# Patient Record
Sex: FEMALE | Race: BLACK OR AFRICAN AMERICAN | NOT HISPANIC OR LATINO | Employment: STUDENT | URBAN - METROPOLITAN AREA
[De-identification: names, ages, dates, MRNs, and addresses within clinical notes are randomized per-mention and may not be internally consistent; named-entity substitution may affect disease eponyms.]

---
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
## 2021-04-15 ENCOUNTER — PATIENT MESSAGE (OUTPATIENT)
Dept: RESEARCH | Facility: HOSPITAL | Age: 21
End: 2021-04-15

## 2022-07-06 PROBLEM — Z11.3 SCREENING FOR STDS (SEXUALLY TRANSMITTED DISEASES): Status: ACTIVE | Noted: 2022-07-06

## 2022-07-06 PROBLEM — Z12.4 PAP SMEAR FOR CERVICAL CANCER SCREENING: Status: ACTIVE | Noted: 2022-07-06

## 2022-07-06 PROBLEM — S09.90XA HEAD INJURY: Status: RESOLVED | Noted: 2018-04-27 | Resolved: 2022-07-06

## 2022-07-06 PROBLEM — Z13.220 NEED FOR LIPID SCREENING: Status: ACTIVE | Noted: 2022-07-06

## 2022-07-06 PROBLEM — R42 DIZZINESS: Status: RESOLVED | Noted: 2018-04-27 | Resolved: 2022-07-06

## 2022-07-06 PROBLEM — K08.89 TOOTHACHE: Status: RESOLVED | Noted: 2020-08-04 | Resolved: 2022-07-06

## 2022-10-10 PROBLEM — Z13.220 NEED FOR LIPID SCREENING: Status: RESOLVED | Noted: 2022-07-06 | Resolved: 2022-10-10

## 2024-11-12 ENCOUNTER — OFFICE VISIT (OUTPATIENT)
Facility: CLINIC | Age: 24
End: 2024-11-12
Payer: MEDICAID

## 2024-11-12 ENCOUNTER — LAB VISIT (OUTPATIENT)
Dept: LAB | Facility: HOSPITAL | Age: 24
End: 2024-11-12
Payer: MEDICAID

## 2024-11-12 VITALS
OXYGEN SATURATION: 100 % | BODY MASS INDEX: 25.01 KG/M2 | HEIGHT: 64 IN | WEIGHT: 146.5 LBS | RESPIRATION RATE: 18 BRPM | HEART RATE: 73 BPM | DIASTOLIC BLOOD PRESSURE: 82 MMHG | SYSTOLIC BLOOD PRESSURE: 118 MMHG | TEMPERATURE: 96 F

## 2024-11-12 DIAGNOSIS — Z11.3 SCREENING FOR STD (SEXUALLY TRANSMITTED DISEASE): ICD-10-CM

## 2024-11-12 DIAGNOSIS — Z13.84 ENCOUNTER FOR SCREENING FOR DENTAL DISORDER: ICD-10-CM

## 2024-11-12 DIAGNOSIS — Z00.00 ANNUAL PHYSICAL EXAM: Primary | ICD-10-CM

## 2024-11-12 DIAGNOSIS — Z00.00 ANNUAL PHYSICAL EXAM: ICD-10-CM

## 2024-11-12 PROCEDURE — 1159F MED LIST DOCD IN RCRD: CPT | Mod: CPTII,,,

## 2024-11-12 PROCEDURE — 1160F RVW MEDS BY RX/DR IN RCRD: CPT | Mod: CPTII,,,

## 2024-11-12 PROCEDURE — 3008F BODY MASS INDEX DOCD: CPT | Mod: CPTII,,,

## 2024-11-12 PROCEDURE — 3079F DIAST BP 80-89 MM HG: CPT | Mod: CPTII,,,

## 2024-11-12 PROCEDURE — 99214 OFFICE O/P EST MOD 30 MIN: CPT | Mod: PBBFAC,PN

## 2024-11-12 PROCEDURE — 3074F SYST BP LT 130 MM HG: CPT | Mod: CPTII,,,

## 2024-11-12 PROCEDURE — 99999 PR PBB SHADOW E&M-EST. PATIENT-LVL IV: CPT | Mod: PBBFAC,,,

## 2024-11-12 PROCEDURE — 99385 PREV VISIT NEW AGE 18-39: CPT | Mod: S$PBB,,,

## 2024-11-12 NOTE — PROGRESS NOTES
SUBJECTIVE     Chief Complaint   Patient presents with    Establish Care    Annual Exam       HPI  Anisa Lester is a 24 y.o. female with multiple medical diagnoses as listed in the medical history and problem list that presents for establishment of care.     History of Present Illness    CHIEF COMPLAINT:  Ms. Lester presents for a physical exam and to establish care with a new PCP.    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 She requires a physical exam and various tests for her nursing school program, including titers for measles, mumps, rubella, hepatitis B, and varicella, as well as a tuberculosis test and a Pap smear. Ms. Lester reports a history of depression for which she previously took medication, though she is not currently on any. She likely should resume medication for depression given her current circumstances. Ms. Lester denies any current medical conditions, ongoing health issues, or history of surgeries.    MEDICAL HISTORY:  Ms. Lester has a history of depression. She was previously diagnosed but is no longer taking medication for this condition. Ms. Lester has an upcoming appointment scheduled for a Pap smear. She reports no current use of contraception. Ms. Lester received a Tdap vaccine in 2022 and a Meningococcal vaccine in 2019.    FAMILY HISTORY:  Family history is significant for mother with hypertension and uncle with diabetes. Her sister is suspected to have undiagnosed diabetes.    TEST RESULTS:  Ms. Lester will undergone a Titers (Vaccine Panel) test, which includes measles, mumps, rubella, hepatitis B surface antibody, and varicella. She has also taken a QuantiFERON-TB Gold test.     SOCIAL HISTORY:  Ms. Lester reports occasional alcohol consumption. She works at [employer] and is currently a nursing student at [employer]. Ms. Lester is single but in a  relationship.      ROS:  General: -fever, -chills, -fatigue, -weight gain, -weight loss  Eyes: -vision changes, -redness, -discharge  ENT: -ear pain, -nasal congestion, -sore throat  Cardiovascular: -chest pain, -palpitations, -lower extremity edema  Respiratory: -cough, -shortness of breath  Gastrointestinal: -abdominal pain, -nausea, -vomiting, -diarrhea, -constipation, -blood in stool  Genitourinary: -dysuria, -hematuria, -frequency  Musculoskeletal: -joint pain, -muscle pain  Skin: -rash, -lesion  Neurological: -headache, -dizziness, -numbness, -tingling  Psychiatric: -anxiety, +depression, -sleep difficulty, -emotional lability         PAST MEDICAL HISTORY:  Past Medical History:   Diagnosis Date    Depression        PAST SURGICAL HISTORY:  History reviewed. No pertinent surgical history.    SOCIAL HISTORY:  Social History     Socioeconomic History    Marital status: Single   Occupational History    Occupation: [employer]   Tobacco Use    Smoking status: Never    Smokeless tobacco: Never   Substance and Sexual Activity    Alcohol use: No    Drug use: Yes     Types: Marijuana    Sexual activity: Yes     Partners: Male     Birth control/protection: Implant       FAMILY HISTORY:  Family History   Problem Relation Name Age of Onset    Hypertension Mother      No Known Problems Father      No Known Problems Sister      No Known Problems Sister      No Known Problems Sister      No Known Problems Brother      No Known Problems Brother      No Known Problems Brother      Diabetes Maternal Uncle      Breast cancer Neg Hx      Colon cancer Neg Hx      Ovarian cancer Neg Hx         ALLERGIES AND MEDICATIONS: updated and reviewed.  Review of patient's allergies indicates:  No Known Allergies  Current Outpatient Medications   Medication Sig Dispense Refill    citalopram (CELEXA) 20 MG tablet Take 1 tablet (20 mg total) by mouth once daily. 30 tablet 11     No current facility-administered medications for  "this visit.     OBJECTIVE     Physical Exam  Vitals:    11/12/24 1028   BP: 118/82   Pulse: 73   Resp: 18   Temp: 96 °F (35.6 °C)    Body mass index is 25.15 kg/m².  Weight: 66.4 kg (146 lb 7.9 oz)   Height: 5' 4" (162.6 cm)     Physical Exam  Constitutional:       Appearance: Normal appearance.   HENT:      Right Ear: Tympanic membrane normal. There is no impacted cerumen.      Left Ear: Tympanic membrane normal. There is no impacted cerumen.      Nose: Nose normal. No congestion or rhinorrhea.      Mouth/Throat:      Mouth: Mucous membranes are moist.      Pharynx: Oropharynx is clear.   Eyes:      General:         Right eye: No discharge.         Left eye: No discharge.      Conjunctiva/sclera: Conjunctivae normal.   Cardiovascular:      Rate and Rhythm: Normal rate and regular rhythm.      Pulses: Normal pulses.      Heart sounds: Normal heart sounds.   Pulmonary:      Effort: Pulmonary effort is normal. No respiratory distress.      Breath sounds: Normal breath sounds. No wheezing.   Chest:      Chest wall: No tenderness.   Abdominal:      General: Bowel sounds are normal. There is no distension.      Palpations: Abdomen is soft.      Tenderness: There is no abdominal tenderness.   Musculoskeletal:         General: No swelling, tenderness or signs of injury. Normal range of motion.      Cervical back: Normal range of motion. No rigidity or tenderness.      Right lower leg: No edema.      Left lower leg: No edema.   Skin:     General: Skin is warm and dry.      Coloration: Skin is not pale.      Findings: No erythema, lesion or rash.   Neurological:      Mental Status: She is alert and oriented to person, place, and time.   Psychiatric:         Mood and Affect: Mood normal.         Behavior: Behavior normal.         Health Maintenance         Date Due Completion Date    Pap Smear 09/11/2019 4/8/2019    COVID-19 Vaccine (3 - 2024-25 season) 09/01/2024 9/4/2021    Chlamydia Screening 11/12/2025 11/12/2024    " TETANUS VACCINE 07/06/2032 7/6/2022    RSV Vaccine (Age 60+ and Pregnant patients) (1 - 1-dose 75+ series) 09/11/2076 ---              ASSESSMENT     24 y.o. female with     1. Annual physical exam    2. Screening for STD (sexually transmitted disease)    3. Encounter for screening for dental disorder        PLAN:     1. Annual physical exam  Counseled on age appropriate medical preventative services, including age appropriate cancer screenings, over all nutritional health, need for a consistent exercise regimen and an over all push towards maintaining a vigorous and active lifestyle.  Counseled on age appropriate vaccines and discussed upcoming health care needs based on age/gender.  Spent time with patient counseling on need for a good patient/provider relationship moving forward.  Discussed use of common OTC medications and supplements.  Discussed common dietary aids and use of caffeine and the need for good sleep hygiene and stress management.  - Hemoglobin A1C; Future  - Lipid Panel; Future  - Comprehensive Metabolic Panel; Future  - HIV 1/2 Ag/Ab (4th Gen); Future  - Hepatitis C Antibody; Future  - Hepatitis B Surface Antigen; Future  - C.trach/N.gonor AMP RNA; Future  - C. trachomatis/N. gonorrhoeae by AMP DNA [facility]; Urine; Future  - Treponema Pallidium Antibodies IgG, IgM; Future  - TSH; Future  - CBC W/ AUTO DIFFERENTIAL; Future  - C.trach/N.gonor AMP RNA; Future  - Rubella antibody, IgG; Future  - Rubeola antibody IgG; Future  - Mumps, IgG Screen; Future  - Hepatitis B Surface Antibody, Qual/Quant; Future  - Varicella zoster antibody, IgG; Future  - Quantiferon Gold TB; Future    2. Screening for STD (sexually transmitted disease)  New; Assessing   -safe sex practices d/w patient at length, especially the use of barrier methods such as the use of condoms to decrease the risk of STD transmission and unwanted pregnancy   -patient also counseled that barrier methods such as condom use will not prevent every  single STD, such as genital herpes, which is transmitted by skin-to-skin contact.  -call clinic back if pt has any questions or concerns  - HIV 1/2 Ag/Ab (4th Gen); Future  - Hepatitis C Antibody; Future  - Hepatitis B Surface Antigen; Future  - C.trach/N.gonor AMP RNA; Future  - C. trachomatis/N. gonorrhoeae by AMP DNA [facility]; Urine; Future  - Treponema Pallidium Antibodies IgG, IgM; Future  - C.trach/N.gonor AMP RNA; Future  - Rubella antibody, IgG; Future  - Rubeola antibody IgG; Future  - Mumps, IgG Screen; Future  - Hepatitis B Surface Antibody, Qual/Quant; Future  - Varicella zoster antibody, IgG; Future    3. Encounter for screening for dental disorder  Oral health screening completed at today's visit. All questions/concerns addressed.     Assessment & Plan    Reviewed patient's vaccine history and determined need for titers to assess immunity status  Recommend cancer screening including Pap smear for preventive care  Considered patient's history of depression and current marijuana use as potential contributors to anxiety symptoms    ANXIETY/DEPRESSION:  Discussed potential link between marijuana use and increased anxiety/depression symptoms.    IMMUNIZATION STATUS:  Ordered titers for measles, mumps, rubella, hepatitis B surface antibody, and varicella.    HEPATITIS C SCREENING:  Ordered hepatitis C test.    LABS:  Ordered routine labs including CBC, thyroid panel, diabetes screening, cholesterol panel, kidney and liver function tests.  Ordered HIV test.  Ordered urine tests.    PAP SMEAR:  Follow up on Thursday or Friday for Pap smear with female provider.    FOLLOW UP:  Follow up in 6 months for regular appointment.  Contact the office if any issues arise before scheduled follow-up.       RTC in 6 months or sooner if needed     Narciso Rodriguez DNP, APRN, FNP-BC  [facility]  11/20/2024 10:35 AM    This note was generated with the assistance of ambient listening technology. Verbal consent was  obtained by the patient and accompanying visitor(s) for the recording of patient appointment to facilitate this note. I attest to having reviewed and edited the generated note for accuracy, though some syntax or spelling errors may persist. Please contact the author of this note for any clarification.

## 2024-12-18 ENCOUNTER — OFFICE VISIT (OUTPATIENT)
Dept: URGENT CARE | Facility: CLINIC | Age: 24
End: 2024-12-18
Payer: MEDICAID

## 2024-12-18 VITALS
HEART RATE: 73 BPM | TEMPERATURE: 98 F | OXYGEN SATURATION: 97 % | SYSTOLIC BLOOD PRESSURE: 119 MMHG | RESPIRATION RATE: 20 BRPM | BODY MASS INDEX: 24.92 KG/M2 | WEIGHT: 146 LBS | DIASTOLIC BLOOD PRESSURE: 81 MMHG | HEIGHT: 64 IN

## 2024-12-18 DIAGNOSIS — N89.8 VAGINAL ITCHING: ICD-10-CM

## 2024-12-18 DIAGNOSIS — A08.4 VIRAL GASTROENTERITIS: Primary | ICD-10-CM

## 2024-12-18 DIAGNOSIS — R11.10 VOMITING, UNSPECIFIED VOMITING TYPE, UNSPECIFIED WHETHER NAUSEA PRESENT: ICD-10-CM

## 2024-12-18 LAB
B-HCG UR QL: NEGATIVE
BILIRUBIN, UA POC OHS: NEGATIVE
BLOOD, UA POC OHS: NEGATIVE
CLARITY, UA POC OHS: CLEAR
COLOR, UA POC OHS: YELLOW
CTP QC/QA: YES
CTP QC/QA: YES
GLUCOSE, UA POC OHS: NEGATIVE
KETONES, UA POC OHS: NEGATIVE
LEUKOCYTES, UA POC OHS: NEGATIVE
NITRITE, UA POC OHS: NEGATIVE
PH, UA POC OHS: 6
PROTEIN, UA POC OHS: NEGATIVE
SARS-COV-2 AG RESP QL IA.RAPID: NEGATIVE
SPECIFIC GRAVITY, UA POC OHS: >=1.03
UROBILINOGEN, UA POC OHS: 0.2

## 2024-12-18 PROCEDURE — 0352U VAGINOSIS SCREEN BY DNA PROBE: CPT

## 2024-12-18 RX ORDER — ONDANSETRON 4 MG/1
4 TABLET, ORALLY DISINTEGRATING ORAL EVERY 6 HOURS PRN
Qty: 12 TABLET | Refills: 0 | Status: SHIPPED | OUTPATIENT
Start: 2024-12-18 | End: 2024-12-21

## 2024-12-18 NOTE — PROGRESS NOTES
"Subjective:      Patient ID: Anisa Lester is a 24 y.o. female.    Vitals:  height is 5' 4" (1.626 m) and weight is 66.2 kg (146 lb). Her oral temperature is 98.1 °F (36.7 °C). Her blood pressure is 119/81 and her pulse is 73. Her respiration is 20 and oxygen saturation is 97%.     Chief Complaint: Emesis    Pt present with symptoms of- Nausea, Vomiting, Sore Throat, Diarrhea and Abdominal Cramps that started last Monday.     Provider note starts below:  Patient presents to clinic for evaluation of nausea, vomiting, diarrhea, and abdominal cramping which onset 3 days ago. States she has had about 3-4 episodes of loose stools and 3 episodes of vomiting since symptoms onset. States she is feeling much better today, but still experiencing nausea. States she may have been exposed to someone with similar symptoms. Denies any current abdominal pain, constipation, blood in stool, frequency, urgency, dysuria, fever, chills, rash. Additionally, patient states she has had some vaginal itching. Would like to be tested for a yeast infection. Denies concern for STDs.    Emesis   This is a new problem. The current episode started in the past 7 days. The problem occurs 2 to 4 times per day. The problem has been gradually worsening. The emesis has an appearance of stomach contents. There has been no fever. Associated symptoms include diarrhea. Pertinent negatives include no abdominal pain, chest pain, chills, coughing, dizziness, fever, headaches or myalgias. She has tried nothing for the symptoms.       Constitution: Negative for chills and fever.   Neck: Negative for neck pain.   Cardiovascular:  Negative for chest pain.   Eyes:  Negative for eye pain.   Respiratory:  Negative for cough.    Gastrointestinal:  Positive for nausea, vomiting and diarrhea. Negative for abdominal pain, abdominal bloating, constipation and bright red blood in stool.        +abdominal cramping   Genitourinary:  Negative for dysuria, frequency, " urgency, hematuria, vaginal pain, vaginal discharge, vaginal bleeding, vaginal odor and genital sore.        +vaginal itching   Musculoskeletal:  Negative for muscle cramps and muscle ache.   Skin:  Negative for pale and rash.   Neurological:  Negative for dizziness, headaches, disorientation and altered mental status.   Psychiatric/Behavioral:  Negative for altered mental status, disorientation and confusion.       Objective:     Physical Exam   Constitutional: She is oriented to person, place, and time.  Non-toxic appearance. She does not appear ill. No distress.   HENT:   Head: Normocephalic and atraumatic.   Eyes: Conjunctivae are normal. Extraocular movement intact   Neck: Neck supple.   Pulmonary/Chest: Effort normal.   Abdominal: Normal appearance. She exhibits no distension. There is no abdominal tenderness. There is no rebound and no guarding.   Musculoskeletal: Normal range of motion.         General: Normal range of motion.   Neurological: She is alert, oriented to person, place, and time and at baseline.   Skin: Skin is warm and dry.   Psychiatric: Her behavior is normal. Mood normal.   Nursing note and vitals reviewed.    Assessment:     Results for orders placed or performed in visit on 12/18/24   POCT urine pregnancy    Collection Time: 12/18/24 11:27 AM   Result Value Ref Range    POC Preg Test, Ur Negative Negative     Acceptable Yes    POCT Urinalysis(Instrument)    Collection Time: 12/18/24 11:28 AM   Result Value Ref Range    Color, POC UA Yellow Yellow, Straw, Colorless    Clarity, POC UA Clear Clear    Glucose, POC UA Negative Negative    Bilirubin, POC UA Negative Negative    Ketones, POC UA Negative Negative    Spec Grav POC UA >=1.030 1.005 - 1.030    Blood, POC UA Negative Negative    pH, POC UA 6.0 5.0 - 8.0    Protein, POC UA Negative Negative    Urobilinogen, POC UA 0.2 <=1.0    Nitrite, POC UA Negative Negative    WBC, POC UA Negative Negative   SARS Coronavirus 2  Antigen, POCT Manual Read    Collection Time: 12/18/24 11:35 AM   Result Value Ref Range    SARS Coronavirus 2 Antigen Negative Negative     Acceptable Yes      1. Viral gastroenteritis    2. Vomiting, unspecified vomiting type, unspecified whether nausea present    3. Vaginal itching      Plan:     Viral gastroenteritis  -     ondansetron (ZOFRAN-ODT) 4 MG TbDL; Take 1 tablet (4 mg total) by mouth every 6 (six) hours as needed (for nausea).  Dispense: 12 tablet; Refill: 0    Vomiting, unspecified vomiting type, unspecified whether nausea present  -     POCT Urinalysis(Instrument)  -     POCT urine pregnancy  -     SARS Coronavirus 2 Antigen, POCT Manual Read    Vaginal itching  -     Vaginosis Screen by DNA Probe            Patient Instructions   Vaginosis screen sent off for testing. We will call or send you a portal message with results in several days. If positive for infection, treatment will be sent to pharmacy.     Viral gastroenteritis   The stomach flu is also known as viral gastroenteritis. It is an infection caused by a virus. You may feel sick to your stomach or throw up. The medical terms for this are nausea and vomiting. You may also have loose stools, belly pain, or cramping. You may not be hungry. Some people have a fever or muscle aches. Viral gastroenteritis is easy to spread from person to person.    Medications  Zofran (ondansetron) 4 mg every 6 hours. As needed for nausea and vomiting.  Over the counter Imodium (loperamide) 4 mg, followed by 2 mg after each loose stool; maximum 16 mg/day. As need for diarrhea.     What care is needed at home?   Drink small amounts of fluid every 15 to 30 minutes. Good fluids to drink are water, broth, sports drinks, and oral electrolyte solutions. It is also important to eat if you are able to keep food down.  Avoid beer, wine, and mixed drinks.  Check your blood sugar more often if you have high blood sugar.  If you are throwing up, try to drink  if you can until you are able to eat small amounts of food.  If you cannot keep fluids down, suck on ice chips.  If you have loose stools, try to drink extra fluids to replace the water your body has lost.  If you are breastfeeding, keep feeding your baby as you normally would.  Avoid sharing your food and drinks.  Stay away from others until the throwing up or loose stools have stopped.  Follow good hygiene practices. Wash your hands often with soap and water for at least 20 seconds. Alcohol-based hand sanitizers also work to kill the virus. This is very important:  After using the bathroom  Before eating  Before cooking    Will physical activity be limited?   Your physical activity will not be limited. Make sure you get lots of rest. You may not be able to travel or go to work until the loose stools and throwing up have stopped for 24 hours.    What changes to diet are needed?   Take small sips of fluids often. Eat foods that have high water content like broth, Jello, and popsicles.  Avoid liquids such as soda, ginger ale, tea, fruit juice, and drinks with caffeine. These can make fluid loss worse. Ask your doctor what foods and drinks are safe for you.  You can eat a variety of food as your appetite gets better. Watch to see if some foods seem to make diarrhea or other symptoms worse, such as dairy products.  Avoid fatty and sugary foods such as cakes, chocolates, ice cream, and take out foods.    How do I prevent this?  Avoid close contact with people who have this illness.  Clean things and surfaces in your home like door handles and phones. Use bleach to clean the area. This can help lower the spread of infection.  Do not share personal things like toothbrushes, towels, and drinking glasses.  Take extra care when traveling. Drink bottled water only and do not eat raw foods.  Consider being vaccinated against certain viral infections. Ask your doctor about the shots that you need.    Please go to the emergency  room if:  You feel very weak, like you cant stand up, and your skin is cool, clammy, or looks blue or gray.  You have severe abdominal pain.  You have chest pain or trouble breathing.  You have signs of severe fluid loss, such as:  No urine for more than 8 hours.  You feel very lightheaded or like you are going to pass out.  You feel weak like you are going to fall.  You are not able to keep any fluids down.  You develop early signs of fluid loss again, such as:  Your urine is very dark colored.  Your mouth is dry.  You have muscle cramps.  You have a lack of energy.  You feel light-headed when you get up.    Should you develop any worsening or new symptoms after leaving urgent care, it is recommended that you go to the ER for further/repeat evaluation.      Follow up with your PCP in 3-5 days after your urgent care visit.      You may call [phone] to schedule an appointment with a primary care doctor or a specialist.      Please remember that you have received care at an urgent care today. Urgent cares are not emergency rooms and are not equipped to handle life threatening emergencies and cannot rule in or out certain medical conditions and you may be released before all of your medical problems are known or treated, please schedule all follow up appointments as discussed and if you have worsening symptoms please go to the ER to rule out potential life threatening problems, as discussed.

## 2024-12-18 NOTE — PATIENT INSTRUCTIONS
Vaginosis screen sent off for testing. We will call or send you a portal message with results in several days. If positive for infection, treatment will be sent to pharmacy.     Viral gastroenteritis   The stomach flu is also known as viral gastroenteritis. It is an infection caused by a virus. You may feel sick to your stomach or throw up. The medical terms for this are nausea and vomiting. You may also have loose stools, belly pain, or cramping. You may not be hungry. Some people have a fever or muscle aches. Viral gastroenteritis is easy to spread from person to person.    Medications  Zofran (ondansetron) 4 mg every 6 hours. As needed for nausea and vomiting.  Over the counter Imodium (loperamide) 4 mg, followed by 2 mg after each loose stool; maximum 16 mg/day. As need for diarrhea.     What care is needed at home?   Drink small amounts of fluid every 15 to 30 minutes. Good fluids to drink are water, broth, sports drinks, and oral electrolyte solutions. It is also important to eat if you are able to keep food down.  Avoid beer, wine, and mixed drinks.  Check your blood sugar more often if you have high blood sugar.  If you are throwing up, try to drink if you can until you are able to eat small amounts of food.  If you cannot keep fluids down, suck on ice chips.  If you have loose stools, try to drink extra fluids to replace the water your body has lost.  If you are breastfeeding, keep feeding your baby as you normally would.  Avoid sharing your food and drinks.  Stay away from others until the throwing up or loose stools have stopped.  Follow good hygiene practices. Wash your hands often with soap and water for at least 20 seconds. Alcohol-based hand sanitizers also work to kill the virus. This is very important:  After using the bathroom  Before eating  Before cooking    Will physical activity be limited?   Your physical activity will not be limited. Make sure you get lots of rest. You may not be able to  travel or go to work until the loose stools and throwing up have stopped for 24 hours.    What changes to diet are needed?   Take small sips of fluids often. Eat foods that have high water content like broth, Jello, and popsicles.  Avoid liquids such as soda, ginger ale, tea, fruit juice, and drinks with caffeine. These can make fluid loss worse. Ask your doctor what foods and drinks are safe for you.  You can eat a variety of food as your appetite gets better. Watch to see if some foods seem to make diarrhea or other symptoms worse, such as dairy products.  Avoid fatty and sugary foods such as cakes, chocolates, ice cream, and take out foods.    How do I prevent this?  Avoid close contact with people who have this illness.  Clean things and surfaces in your home like door handles and phones. Use bleach to clean the area. This can help lower the spread of infection.  Do not share personal things like toothbrushes, towels, and drinking glasses.  Take extra care when traveling. Drink bottled water only and do not eat raw foods.  Consider being vaccinated against certain viral infections. Ask your doctor about the shots that you need.    Please go to the emergency room if:  You feel very weak, like you cant stand up, and your skin is cool, clammy, or looks blue or gray.  You have severe abdominal pain.  You have chest pain or trouble breathing.  You have signs of severe fluid loss, such as:  No urine for more than 8 hours.  You feel very lightheaded or like you are going to pass out.  You feel weak like you are going to fall.  You are not able to keep any fluids down.  You develop early signs of fluid loss again, such as:  Your urine is very dark colored.  Your mouth is dry.  You have muscle cramps.  You have a lack of energy.  You feel light-headed when you get up.    Should you develop any worsening or new symptoms after leaving urgent care, it is recommended that you go to the ER for further/repeat evaluation.       Follow up with your PCP in 3-5 days after your urgent care visit.      You may call [phone] to schedule an appointment with a primary care doctor or a specialist.      Please remember that you have received care at an urgent care today. Urgent cares are not emergency rooms and are not equipped to handle life threatening emergencies and cannot rule in or out certain medical conditions and you may be released before all of your medical problems are known or treated, please schedule all follow up appointments as discussed and if you have worsening symptoms please go to the ER to rule out potential life threatening problems, as discussed.

## 2024-12-18 NOTE — LETTER
December 18, 2024      [facility] [address]  [place] [address]  Phone: [phone]  Fax: [phone]       Patient: Anisa Lester   YOB: 2000  Date of Visit: 12/18/2024    To Whom It May Concern:    Indy Lester  was at [facility] on 12/18/2024. The patient may return to work/school on 12/19/2024 with no restrictions. If you have any questions or concerns, or if I can be of further assistance, please do not hesitate to contact me.    Sincerely,      Va Perez PA-C

## 2024-12-21 LAB
BACTERIAL VAGINOSIS DNA: DETECTED
CANDIDA GLABRATA/KRUSEI: NOT DETECTED
CANDIDA RRNA VAG QL PROBE: DETECTED
TRICHOMONAS VAGINALIS: NOT DETECTED

## 2024-12-28 DIAGNOSIS — B37.31 VAGINAL YEAST INFECTION: ICD-10-CM

## 2024-12-28 DIAGNOSIS — N76.0 BACTERIAL VAGINOSIS: Primary | ICD-10-CM

## 2024-12-28 DIAGNOSIS — B96.89 BACTERIAL VAGINOSIS: Primary | ICD-10-CM

## 2024-12-28 RX ORDER — METRONIDAZOLE 500 MG/1
500 TABLET ORAL EVERY 12 HOURS
Qty: 14 TABLET | Refills: 0 | Status: SHIPPED | OUTPATIENT
Start: 2024-12-28 | End: 2025-01-04

## 2024-12-28 RX ORDER — FLUCONAZOLE 150 MG/1
150 TABLET ORAL DAILY
Qty: 1 TABLET | Refills: 0 | Status: SHIPPED | OUTPATIENT
Start: 2024-12-28 | End: 2024-12-29

## 2025-02-14 ENCOUNTER — PATIENT OUTREACH (OUTPATIENT)
Dept: ADMINISTRATIVE | Facility: OTHER | Age: 25
End: 2025-02-14
Payer: COMMERCIAL

## 2025-02-14 NOTE — PROGRESS NOTES
CHW - Initial Contact    This Community Health Worker completed the Social Determinant of Health questionnaire with patient via telephone today.    Pt identified barriers of most importance are: Patient did not report any barriers.   Referrals to community agencies completed with patient/caregiver consent outside of [facility] include: No.  Referrals were put through [facility] - no:   Support and Services: No.  Other information discussed the patient needs / wants help with: SDOH completed. Patient did not report any barriers at this time. Patient's case will be closed.   Follow up required: No.

## 2025-05-12 ENCOUNTER — TELEPHONE (OUTPATIENT)
Facility: CLINIC | Age: 25
End: 2025-05-12
Payer: COMMERCIAL